# Patient Record
Sex: FEMALE | Race: WHITE | NOT HISPANIC OR LATINO | URBAN - METROPOLITAN AREA
[De-identification: names, ages, dates, MRNs, and addresses within clinical notes are randomized per-mention and may not be internally consistent; named-entity substitution may affect disease eponyms.]

---

## 2025-04-28 ENCOUNTER — APPOINTMENT (OUTPATIENT)
Dept: DERMATOLOGY | Facility: CLINIC | Age: 65
End: 2025-04-28
Payer: MEDICARE

## 2025-04-28 DIAGNOSIS — L66.12 FRONTAL FIBROSING ALOPECIA: Primary | ICD-10-CM

## 2025-04-28 PROCEDURE — 1159F MED LIST DOCD IN RCRD: CPT | Performed by: NURSE PRACTITIONER

## 2025-04-28 PROCEDURE — 99203 OFFICE O/P NEW LOW 30 MIN: CPT | Performed by: NURSE PRACTITIONER

## 2025-04-28 NOTE — Clinical Note
A symmetric band-like zone of incomplete hair loss along the anterior hairline present for more than 6 years. There is no perifollicular erythema or scale, indicating the disease is burnt out. She denies pruritus and trichodynia.  She doesn't believe the disease has progressed recently.

## 2025-04-28 NOTE — Clinical Note
-Discussed diagnosis and treatment considerations, but patient declines stating she is ok with the disease/

## 2025-04-28 NOTE — PROGRESS NOTES
Subjective     Nora E Nageotte is a 64 y.o. female who presents for the following: Alopecia.   New patient in today for hair loss.    Review of Systems:  No other skin or systemic complaints other than what is documented elsewhere in the note.    The following portions of the chart were reviewed this encounter and updated as appropriate:       Skin Cancer History  Biopsy Log Book  No skin cancers from Specimen Tracking.    Additional History      Specialty Problems    None    Past Medical History:  Nora E Nageotte  has a past medical history of Disorder of thyroid, unspecified, Hypothyroidism, unspecified (07/31/2018), Multiple sclerosis (Multi) (01/05/2018), Personal history of malignant neoplasm of other parts of uterus, Personal history of malignant neoplasm, unspecified, and Rosacea, unspecified (01/15/2014).    Past Surgical History:  Nora E Nageotte  has a past surgical history that includes Total abdominal hysterectomy w/ bilateral salpingoophorectomy (03/04/2015); Other surgical history (07/26/2017); and Refractive surgery (07/26/2017).    Family History:  Patient family history is not on file.    Social History:  Nora E Nageotte  has no history on file for tobacco use, alcohol use, and drug use.    Allergies:  Patient has no allergy information on record.    Current Medications / CAM's:  Current Medications[1]     Objective   Well appearing patient in no apparent distress; mood and affect are within normal limits.      Assessment/Plan   Skin Exam  1. FRONTAL FIBROSING ALOPECIA  Scalp  A symmetric band-like zone of incomplete hair loss along the anterior hairline present for more than 6 years. There is no perifollicular erythema or scale, indicating the disease is burnt out. She denies pruritus and trichodynia.  She doesn't believe the disease has progressed recently.  -Discussed diagnosis and treatment considerations, but patient declines stating she is ok with the disease/            [1] No current  outpatient medications on file.

## 2025-05-14 ENCOUNTER — OFFICE VISIT (OUTPATIENT)
Dept: URGENT CARE | Age: 65
End: 2025-05-14
Payer: MEDICARE

## 2025-05-14 VITALS
TEMPERATURE: 98 F | BODY MASS INDEX: 23.49 KG/M2 | WEIGHT: 155 LBS | HEIGHT: 68 IN | SYSTOLIC BLOOD PRESSURE: 125 MMHG | RESPIRATION RATE: 16 BRPM | DIASTOLIC BLOOD PRESSURE: 85 MMHG | OXYGEN SATURATION: 97 % | HEART RATE: 57 BPM

## 2025-05-14 DIAGNOSIS — H61.23 IMPACTED CERUMEN, BILATERAL: Primary | ICD-10-CM

## 2025-05-14 PROCEDURE — 99203 OFFICE O/P NEW LOW 30 MIN: CPT | Performed by: PHYSICIAN ASSISTANT

## 2025-05-14 PROCEDURE — 69209 REMOVE IMPACTED EAR WAX UNI: CPT | Performed by: PHYSICIAN ASSISTANT

## 2025-05-14 PROCEDURE — 3008F BODY MASS INDEX DOCD: CPT | Performed by: PHYSICIAN ASSISTANT

## 2025-05-14 PROCEDURE — 1036F TOBACCO NON-USER: CPT | Performed by: PHYSICIAN ASSISTANT

## 2025-05-14 RX ORDER — LEVOTHYROXINE SODIUM 112 UG/1
112 TABLET ORAL DAILY
COMMUNITY

## 2025-05-14 ASSESSMENT — PATIENT HEALTH QUESTIONNAIRE - PHQ9
1. LITTLE INTEREST OR PLEASURE IN DOING THINGS: NOT AT ALL
SUM OF ALL RESPONSES TO PHQ9 QUESTIONS 1 AND 2: 0
2. FEELING DOWN, DEPRESSED OR HOPELESS: NOT AT ALL

## 2025-05-14 NOTE — PATIENT INSTRUCTIONS
Patient avoid using cotton swabs to clean the ears. May try ear oil OTC which will help to keep the cerumen soft and gentle irrigation with water and a wash cloth should be all that is required to keep the ears clean. If there is excessive cerumen production, we can professionally clean the ears in the office or they can use a home ear cleaning kit such as Debrox.

## 2025-05-14 NOTE — PROGRESS NOTES
"Subjective   Patient ID: Nora E Nageotte is a 64 y.o. female. They present today with a chief complaint of Earache (Left ear blocked and painful x 1 week).    History of Present Illness    Earache       64-year-old patient presents to clinic with complaints of left ear blockage with associated intermittent sharp pain ongoing for 1 week which was diagnosed as cerumen impaction a week ago and patient was advised to do Debrox drops which did not resolve symptoms.   Patient reports at PCP 1 week ago they attempted to remove the wax but were unsuccessful. Denies fevers, chills, body aches, nausea, vomiting, ear discharge, dizziness, rhinorrhea, sinus pain, cough, sore throat, shortness of breath      Past Medical History  Allergies as of 05/14/2025    (No Known Allergies)       Prescriptions Prior to Admission[1]     Medical History[2]    Surgical History[3]     reports that she has never smoked. She has never used smokeless tobacco.    Review of Systems  ROS negative with the exception as noted on HPI                                Objective    Vitals:    05/14/25 1212   BP: 125/85   BP Location: Left arm   Patient Position: Sitting   Resp: 16   Temp: 36.7 °C (98 °F)   SpO2: 97%   Weight: 70.3 kg (155 lb)   Height: 1.727 m (5' 8\")     No LMP recorded.    Physical Exam  Constitutional:       Appearance: Normal appearance.   HENT:      Head: Normocephalic and atraumatic.      Right Ear: Tympanic membrane, ear canal and external ear normal. There is impacted cerumen.      Left Ear: Tympanic membrane, ear canal and external ear normal. There is impacted cerumen.      Nose: No mucosal edema or rhinorrhea.      Right Sinus: No maxillary sinus tenderness or frontal sinus tenderness.      Left Sinus: No maxillary sinus tenderness or frontal sinus tenderness.      Mouth/Throat:      Lips: Pink.      Mouth: Mucous membranes are moist. No oral lesions.      Dentition: Normal dentition. No gingival swelling.      Tongue: No " lesions. Tongue does not deviate from midline.      Palate: No mass and lesions.      Pharynx: No pharyngeal swelling, posterior oropharyngeal erythema, uvula swelling or postnasal drip.   Cardiovascular:      Rate and Rhythm: Normal rate and regular rhythm.      Heart sounds: No murmur heard.  Pulmonary:      Effort: Pulmonary effort is normal. No respiratory distress.      Breath sounds: Normal breath sounds. No stridor. No wheezing, rhonchi or rales.   Lymphadenopathy:      Cervical: No cervical adenopathy.   Neurological:      Mental Status: She is alert.         Ear Cerumen Removal    Date/Time: 5/14/2025 1:36 PM    Performed by: Casi Traylor PA-C  Authorized by: Casi Traylor PA-C    Consent:     Consent obtained:  Verbal    Consent given by:  Patient    Risks discussed:  Dizziness, incomplete removal, TM perforation and pain  Universal protocol:     Patient identity confirmed:  Verbally with patient  Procedure details:     Location:  L ear and R ear    Procedure type: irrigation      Successful cerumen removal: cerumen fully removed form right year. partial removal from left ear.    Post-procedure details:     Inspection:  Some cerumen remaining and TM intact    Hearing quality:  Improved    Procedure completion:  Tolerated well, no immediate complications      Point of Care Test & Imaging Results from this visit  No results found for this visit on 05/14/25.   Imaging  No results found.    Cardiology, Vascular, and Other Imaging  No other imaging results found for the past 2 days      Diagnostic study results (if any) were reviewed by Casi Traylor PA-C.    Assessment/Plan   Allergies, medications, history, and pertinent labs/EKGs/Imaging reviewed by Casi Traylor PA-C.   left ear blockage with associated intermittent sharp pain ongoing for 1 week which was diagnosed as cerumen impaction a week ago and patient was advised to do Debrox drops which did not resolve symptoms.    Cerumen successfully removed from right ear via irrigation.  Partial cerumen removal performed at the left ear via irrigation but patient asked to stop procedure and would like to continue to try Debrox  drops at home. Ear care discussed. Patient avoid using cotton swabs to clean the ears. May try ear oil OTC which will help to keep the cerumen soft and gentle irrigation with water and a wash cloth should be all that is required to keep the ears clean. If there is excessive cerumen production, we can professionally clean the ears in the office or they can use a home ear cleaning kit such as Debrox.   Medical Decision Making      Orders and Diagnoses  There are no diagnoses linked to this encounter.    Medical Admin Record      Patient disposition: Home    Electronically signed by Casi Traylor PA-C  12:15 PM           [1] (Not in a hospital admission)  [2]   Past Medical History:  Diagnosis Date    Disorder of thyroid, unspecified     Thyroid trouble    Hypothyroidism, unspecified 07/31/2018    Hypothyroidism    Multiple sclerosis (Multi) 01/05/2018    Multiple sclerosis    Personal history of malignant neoplasm of other parts of uterus     History of malignant neoplasm of endometrium    Personal history of malignant neoplasm, unspecified     History of malignant neoplasm    Rosacea, unspecified 01/15/2014    Rosacea   [3]   Past Surgical History:  Procedure Laterality Date    OTHER SURGICAL HISTORY  07/26/2017    Excision Of Lesion Face    REFRACTIVE SURGERY  07/26/2017    Corneal LASIK    TOTAL ABDOMINAL HYSTERECTOMY W/ BILATERAL SALPINGOOPHORECTOMY  03/04/2015    Total Abdominal Hysterectomy With Removal Of Both Ovaries